# Patient Record
Sex: MALE
[De-identification: names, ages, dates, MRNs, and addresses within clinical notes are randomized per-mention and may not be internally consistent; named-entity substitution may affect disease eponyms.]

---

## 2020-03-28 NOTE — DIAGNOSTIC IMAGING REPORT
EXAMINATION:  CHEST SINGLE (PORTABLE)    



INDICATION:       

^ERMD ORDER

^[id number]

^1548

^Y    



COMPARISON:  CT abdomen and pelvis 12/05/2017 and chest radiograph 12/05/2017

     

FINDINGS:  AP view   



TUBES and LINES:  None.



LUNGS:  Lungs are well inflated.  Mildly increased bilateral hilar and lower

lobes reticular opacities with peribronchial thickening.   No lobar

consolidation.



PLEURA:  No pleural effusion or pneumothorax.



HEART AND MEDIASTINUM:  The cardiomediastinal silhouette is unremarkable..  



BONES AND SOFT TISSUES:  No acute osseous lesion.  Soft tissues are

unremarkable.



UPPER ABDOMEN: No free air under the diaphragm.    



IMPRESSION: 

Radiographic findings may suggest atypical infection or Central pulmonary

vascular congestion.





Signed by: Dr. Brooke Banegas M.D. on 3/28/2020 4:27 PM

## 2020-03-28 NOTE — XMS REPORT
Patient Summary Document

                             Created on: 2020



SHONA DIEHL

External Reference #: [id number]

: 1960

Sex: Male



Demographics







                          Address                   [address] [place] [address]  [address]

 

                          Home Phone                [phone]

 

                          Preferred Language        Unknown

 

                          Marital Status            Unknown

 

                          Jewish Affiliation     Unknown

 

                          Race                      Unknown

 

                          Ethnic Group              Unknown





Author







                          Author                    [facility]

 

                          [facility]

 

                          Address                   Unknown

 

                          Phone                     Unavailable







Care Team Providers







                    Care Team Member Name    Role                Phone

 

                    ANGEL HARRELL    Unavailable         Unavailable







Problems

This patient has no known problems.



Allergies, Adverse Reactions, Alerts

This patient has no known allergies or adverse reactions.



Medications

This patient has no known medications.



Results







           Test Description    Test Time    Test Comments    Text Results    Atomic Results    Result

 Comments

 

                CT ABDOMEN/PELVIS W                                        [place] [address]      Patient Name: SHONA DIEHL   MR #: [id number]    : 1960 Age/Sex: 57/M  Acct #: [id number] Req 
#: [id number]  Adm Physician:     Ordered by: GRANT QUIJANO NP  Report #: 
[id number]   Location: ER  Room/Bed:     
__________________________________________________________________________
_________________________    Procedure: [id number] CT/CT ABDOMEN/PELVIS W  Exam 
Date: 17                            Exam Time: 2200       REPORT STATUS: 
Signed    EXAM: CT ABDOMEN AND PELVIS with IV CONTRAST   DATE: 2017 8:40 PM
 Time stamp on Exam: 2211 hours   INDICATION:  Abdominal pain   COMPARISON:  
None    TECHNIQUE: The abdomen and pelvis were scanned using a multidetector 
helical   scanner. Coronal and sagittal reformations were obtained. Routine 
protocol   performed.   IV Contrast: 100 cc Isovue-370   Oral Contrast: 
Gastrografin   CTDIvol has been reviewed. It is below the limits set by the 
Radiation Protocol   Committee (RPC).      FINDINGS:   LOWER THORAX: No 
consolidations      LIVER: No masses   BILIARY: Cholecystectomy. Expected mild 
prominence of the bile ducts secondary   to reservoir effect.       SPLEEN: No 
masses   PANCREAS: No masses      ADRENALS: No nodules   KIDNEYS: Symmetric 
perfusion. No enhancing masses. No hydronephrosis.      GI TRACT: No distention,
wall thickening or evidence of obstruction. Normal   appendix.      VESSELS: 
Mild atherosclerotic changes without aneurysm.   PERITONEUM/RETROPERITONEUM: No 
free air or fluid   LYMPH NODES: No lymphadenopathy      REPRODUCTIVE ORGANS: 
Unremarkable   BLADDER: Unremarkable      SOFT TISSUES: Unremarkable   BONES: No
suspicious bone lesions. Degenerative changes of the lower thoracic   and lumbar
spine with chronic wedging of the lower thoracic spine.      IMPRESSION:   No CT
findings to explain patient's abdominal pain. No bowel obstruction or   evidence
of appendicitis.      Signed by: Dr. Moreno Ibrahim M.D. on 2017 10:25 
PM        Dictated By: MORENO IBRAHIM MD  Electronically Signed By: MORENO IBRAHIM MD on 17  Transcribed By: BORIS on 17       COPY TO:   
GRANT QUIJANO NP                       

 

                CHEST SINGLE (PORTABLE)                                        [place] [address]      Patient Name: 
SHONA DIEHL   MR #: [id number]    : 1960 Age/Sex: 57/M  Acct #: 
[id number] Req #: [id number]  Adm Physician:     Ordered by: GRANT QUIJANO 
NP  Report #: [id number]   Location: ER  Room/Bed:     
__________________________________________________________________________
_________________________    Procedure: [id number] DX/CHEST SINGLE (PORTABLE)  
Exam Date: 17                            Exam Time:        REPORT 
STATUS: Signed    EXAM: CHEST SINGLE (PORTABLE), AP 1 view   DATE: 2017 
8:31 PM  Time stamp on exam: 2100 hours   INDICATION: Nausea, stomach pain   
COMPARISON: None      FINDINGS:   LINES/TUBES: None      LUNGS: No 
consolidations or edema. Nodular densities projected over each lung   base are 
most likely nipple shadows.      PLEURA: No effusions or pneumothorax.      
HEART AND MEDIASTINUM: Normal size and contour.      BONES AND SOFT TISSUES: No 
acute findings.       IMPRESSION:   No acute thoracic abnormality.            
Signed by: Dr. Moreno Ibrahim M.D. on 2017 9:31 PM        Dictated By: 
MORENO IBRAHIM MD  Electronically Signed By: MORENO IBRAHIM MD on 17  
Transcribed By: BORIS on 17       COPY TO:   GRANT QUIJANO NP

## 2020-03-28 NOTE — DIAGNOSTIC IMAGING REPORT
EXAM: CT Abdomen and Pelvis WITH contrast  

INDICATION:      

^abd pain/n/v/

^[id number]

^1550    



COMPARISON: CT abdomen and pelvis 12 4/5/2017

TECHNIQUE: Abdomen and pelvis were scanned utilizing a multidetector helical

scanner from the lung base to the pubic symphysis after administration of IV

contrast. Coronal and sagittal reformations were obtained. Routine protocol was

performed. Scan was performed when during portal venous phase.

            IV CONTRAST: 100 mL of Isovue-370

            ORAL CONTRAST: None

            RADIATION DOSE: Total DLP: 289.97 mGy*cm

             Estimated effective dose: (DLP x 0.015 x size factor) mSv

            COMPLICATIONS: None



FINDINGS:



LINES and TUBES: None.



LOWER THORAX:  Coronary artery calcifications. Lung bases are clear. Mild

circumferential wall thickening of the distal esophagus may be related to small

hiatal hernia and/or esophageal wall thickening.



HEPATOBILIARY:      No focal hepatic lesions. No biliary ductal dilation. 



GALLBLADDER: Cholecystectomy.



SPLEEN: No splenomegaly. 



PANCREAS: No focal masses or ductal dilatation.  



ADRENALS: No adrenal nodules    



KIDNEYS/URETERS: Kidneys enhance symmetrically.  No hydronephrosis. No cystic

or solid mass lesions.  No stones.



GI TRACT: No abnormal distention, wall thickening, or evidence of bowel

obstruction.       Appendix is normal.



PELVIC ORGANS/BLADDER: Unremarkable.



LYMPH NODES: No lymphadenopathy.



VESSELS: Unremarkable.



PERITONEUM / RETROPERITONEUM: No free air or fluid.



BONES: Mild multilevel degenerative changes of the upper lumbar spine.



SOFT TISSUES: Unremarkable.            



IMPRESSION: 

No acute abnormalities in the abdomen and pelvis.



Signed by: Dr. Brooke Banegas M.D. on 3/28/2020 5:14 PM

## 2020-03-29 NOTE — NUR
Bedside report given to morning nurse. Pt alert and orient to name, lying in bed HOB 45 
degrees. c/o mild abdominal pain. Call light within reach. Bed low and locked.

## 2020-03-29 NOTE — NUR
Manual /100.after receiving scheduled and PRN blood pressure meds. Deepa, NP was 
notified. New order received for cardiology consult for uncontrolled htn

## 2020-03-30 NOTE — NUR
BEDSIDE REPORT RECEIVED FROM DAY RN. PT RESTING IN SEMI-FOWLERS POSITION IN BED WATCHING TV. 
RESPIRATIONS ARE  EVEN AND UNLABORED. TELE ON. NS +20KCL INFUSING  ML/HR VIA IV PUMP. 
PROTONIX DRIP INFUSING AT 10 ML/HR. PT DENIES PAIN AT THIS TIME. PT STATES HE IS REALLY 
HUNGRY. VOIDING WITHOUT DIFFICULTY. CALL LIGHT WITHIN REACH. BED IN LOW POSITION.

## 2020-03-30 NOTE — OPERATIVE REPORT
DATE OF PROCEDURE:  03/30/2020

 

SURGEON:  Jorge Reyes MD

 

PROCEDURE:  EGD with biopsies.

 

INDICATIONS FOR EGD:  Intractable nausea and vomiting, upper abdominal pain.

 

MEDICATIONS:  The patient was done under MAC, please see anesthesiologist's note.

 

PROCEDURE IN DETAIL:  With the patient in the left lateral decubitus position, a

flexible fiberoptic Olympus gastroscope was introduced into the esophagus under direct

visualization without any difficulty.  There was some patchy erythema noted in distal

esophagus.  The scope was then advanced with ease into the stomach and mucosa overlying

the antrum and the body revealed some patchy intense erythema and low-grade to moderate

edema, and biopsies were obtained and sent to stain for H. pylori.  Pylorus was

intubated with ease and the scope was advanced all the way to the second portion of the

duodenum.  Biopsies were obtained from the proximal second portion and the duodenal bulb

to rule out sprue.  The scope was then withdrawn back into the stomach and retroflexed.

Mucosa overlying the fundus and the cardia appeared to be within normal limits.  The

scope was then straightened out, it was subsequently withdrawn, and the patient

tolerated the procedure well. 

 

IMPRESSION:  

1. Distal esophagitis, mild.

2. Gastritis, biopsied, biopsies sent to stain for Helicobacter pylori.

3. Rule out sprue.

 

PLAN:  Follow up histology.  Continue current therapy.  Initiate clear liquid diet.

 

 

 

 

______________________________

Jorge Reyes MD

 

[facility]

D:  03/30/2020 10:54:12

T:  03/30/2020 11:04:34

Job #:  [id number]

 

cc:            Sánchez Hancock MD

## 2020-03-30 NOTE — NUR
Pt out of room and no family at bedside.   will follow up as able. 





KELLEY VALENTE



Spiritual Care Department

O: [phone]

## 2020-03-31 NOTE — NUR
BEDSIDE REPORT RECEIVED FROM DAY RN. PT IN SEMI-FOWLERS POSITION RESTING IN BED. PT IS ALERT 
AND ORIENTED X3. RESPIRATIONS ARE EVEN AND UNLABORED. PT REPORTS HAD SMALL LIQUID BM THIS 
AM. PT DENIES PAIN. 20 G PIV IN RT WRIST. PT UP TO BATHROOM VOIDING WITHOUT DIFFICULTY. TELE 
ON. PT TOLERATING DIET WELL. CALL LIGHT WITHIN REACH. BED LOCKED AND IN LO9W POSITION.

## 2020-04-01 NOTE — NUR
Pt. expressed no spiritual or emotional concerns at this time.  provided hospitality 
and information on how to reach , if needed. No need to follow at this time.





KELLEY VALETNE



Spiritual Care Department

O: [phone]

## 2020-04-01 NOTE — NUR
Patient discharged home. Leaves by wheelchair to personal vehicle. AAOx4. 98.1 oral temp, 83 
HR, 147/70, 18 RR, 97% RA, FSBG 198. IV discontinued and no signs of infiltration.

## 2020-04-01 NOTE — DISCHARGE SUMMARY
PERTINENT HISTORY AND PHYSICAL FINDINGS/CHIEF COMPLAINT:  Upper abdominal pain with

nausea and vomiting.  Mr. Alfred is a 59-year-old male, who admitted with complaints

of abdominal pain and associated nausea and vomiting for three days.  He was able to

keep liquids down.  Denied fever or diarrhea. 

 

PAST MEDICAL AND SURGICAL HISTORY:  Hypertension, type 2 diabetes mellitus,

gastroparesis, and gastroesophageal reflux disease.  Denies prior surgery. 

 

FAMILY HISTORY:  His sister had diabetes mellitus.  His father had a stroke.

 

SOCIAL HISTORY:  Noncontributory.

 

ALLERGIES:  SUDAFED.

 

ADMITTING DIAGNOSES:  

1. Abdominal pain with nausea.

2. Hypertension.

3. Type 2 diabetes mellitus.

4. Gastroesophageal reflux disease.

5. Hypokalemia.

 

DISCHARGE DIAGNOSES:  

1. Recent abdominal pain with nausea and vomiting, status post

esophagogastroduodenoscopy on 3/30/2020. 

2. Uncontrolled hypertension.

3. Uncontrolled type 2 diabetes mellitus.

4. Hyperlipidemia. 

On admission, WBCs 10.47, hemoglobin 16.2, hematocrit 46.2, and platelets 248.  Sodium

134, potassium 3.9, chloride 91, CO2 of 29, BUN 20, creatinine 0.73, EGFR greater than

60, glucose 209, calcium 10, total bilirubin 3.7, AST 14, ALT 16, alkaline phosphatase

108, creatine kinase 82, CK-MB 0.70, troponin I less than 0.001, and total protein 8.4.

Urinalysis positive for protein, glucose, ketones, blood, rbc's, granular casts, and

mucus.  Chest x-ray showed possible atypical infection or central pulmonary vascular

congestion.  CT of the abdomen and pelvis showed no acute abnormalities in the abdomen

or pelvis.  Per Dr. Reyes's note from yesterday and last night, nausea, vomiting, and

upper abdominal pain, all resolved.  He is tolerating an ADA diet well.  He is to remain

on oral Protonix for about 3 months and Reglan for 3-4 weeks.  Thus, those prescriptions

have been written along with Esidrix 25 mg p.o. daily, Catapres 0.1 mg p.o. every 4

hours p.r.n. for elevated blood pressure, Norvasc 10 mg p.o. daily, metoclopramide 10 mg

before meals and at bedtime, and Protonix 40 mg b.i.d. 

 

Discharge vital signs; temperature 98.1, heart rate 83, respirations 20, blood pressure

147/70, and oxygen saturation 97%. 

 

Yesterday; WBC 5.85, hemoglobin 13.3, hematocrit 38.2, and platelets 198.  Today; the

sodium 135, potassium 3.8, chloride 100, CO2 of 29, BUN 10, creatinine 0.68, estimated

GFR greater than 60, glucose 140, total bilirubin 1.5, and total protein 5.9.  LFTs are

within normal limits.  The patient is to continue with ADA diet.  Activity level as

tolerated.  Follow up with Dr. Reyes in his office.  Follow up with Cardiology p.r.n.  Follow up with his PCP, Dr. Donavan Kelsey in 1-2 weeks. 

 

 

Dictated by Nitish Saldana NP

 

______________________________

MD [facility]

D:  04/01/2020 13:58:26

T:  04/01/2020 14:40:16

Job #:  [id number]

## 2021-04-18 ENCOUNTER — HOSPITAL ENCOUNTER (EMERGENCY)
Dept: HOSPITAL 88 - ER | Age: 61
Discharge: HOME | End: 2021-04-18
Payer: COMMERCIAL

## 2021-04-18 VITALS — BODY MASS INDEX: 27.64 KG/M2 | HEIGHT: 66 IN | WEIGHT: 172 LBS

## 2021-04-18 DIAGNOSIS — K21.9: ICD-10-CM

## 2021-04-18 DIAGNOSIS — R19.7: ICD-10-CM

## 2021-04-18 DIAGNOSIS — R10.84: Primary | ICD-10-CM

## 2021-04-18 DIAGNOSIS — I10: ICD-10-CM

## 2021-04-18 DIAGNOSIS — E11.65: ICD-10-CM

## 2021-04-18 DIAGNOSIS — R11.2: ICD-10-CM

## 2021-04-18 LAB
ALBUMIN SERPL-MCNC: 4.8 G/DL (ref 3.5–5)
ALBUMIN/GLOB SERPL: 1.2 {RATIO} (ref 0.8–2)
ALP SERPL-CCNC: 105 IU/L (ref 40–150)
ALT SERPL-CCNC: 13 IU/L (ref 0–55)
ANION GAP SERPL CALC-SCNC: 18.8 MMOL/L (ref 8–16)
BACTERIA URNS QL MICRO: (no result) /HPF
BASOPHILS # BLD AUTO: 0 10*3/UL (ref 0–0.1)
BASOPHILS NFR BLD AUTO: 0.2 % (ref 0–1)
BUN SERPL-MCNC: 21 MG/DL (ref 7–26)
BUN/CREAT SERPL: 28 (ref 6–25)
CALCIUM SERPL-MCNC: 9.4 MG/DL (ref 8.4–10.2)
CHLORIDE SERPL-SCNC: 94 MMOL/L (ref 98–107)
CLARITY UR: CLEAR
CO2 SERPL-SCNC: 24 MMOL/L (ref 22–29)
COLOR UR: YELLOW
DEPRECATED NEUTROPHILS # BLD AUTO: 5.6 10*3/UL (ref 2.1–6.9)
DEPRECATED RBC URNS MANUAL-ACNC: (no result) /HPF (ref 0–5)
EGFRCR SERPLBLD CKD-EPI 2021: > 60 ML/MIN (ref 60–?)
EOSINOPHIL # BLD AUTO: 0 10*3/UL (ref 0–0.4)
EOSINOPHIL NFR BLD AUTO: 0.1 % (ref 0–6)
EPI CELLS URNS QL MICRO: (no result) /LPF
ERYTHROCYTE [DISTWIDTH] IN CORD BLOOD: 12.8 % (ref 11.7–14.4)
GLOBULIN PLAS-MCNC: 3.9 G/DL (ref 2.3–3.5)
GLUCOSE SERPLBLD-MCNC: 206 MG/DL (ref 74–118)
HCT VFR BLD AUTO: 45.7 % (ref 38.2–49.6)
HGB BLD-MCNC: 15.9 G/DL (ref 14–18)
KETONES UR QL STRIP.AUTO: (no result)
LEUKOCYTE ESTERASE UR QL STRIP.AUTO: NEGATIVE
LIPASE SERPL-CCNC: 9 U/L (ref 8–78)
LYMPHOCYTES # BLD: 1.5 10*3/UL (ref 1–3.2)
LYMPHOCYTES NFR BLD AUTO: 18.7 % (ref 18–39.1)
MCH RBC QN AUTO: 30.3 PG (ref 28–32)
MCHC RBC AUTO-ENTMCNC: 34.8 G/DL (ref 31–35)
MCV RBC AUTO: 87 FL (ref 81–99)
MONOCYTES # BLD AUTO: 0.8 10*3/UL (ref 0.2–0.8)
MONOCYTES NFR BLD AUTO: 10.1 % (ref 4.4–11.3)
MUCOUS THREADS URNS QL MICRO: (no result)
NEUTS SEG NFR BLD AUTO: 70.5 % (ref 38.7–80)
NITRITE UR QL STRIP.AUTO: NEGATIVE
PLATELET # BLD AUTO: 252 X10E3/UL (ref 140–360)
POTASSIUM SERPL-SCNC: 3.8 MMOL/L (ref 3.5–5.1)
PROT UR QL STRIP.AUTO: (no result)
RBC # BLD AUTO: 5.25 X10E6/UL (ref 4.3–5.7)
SODIUM SERPL-SCNC: 133 MMOL/L (ref 136–145)
SP GR UR STRIP: 1.02 (ref 1.01–1.02)
UROBILINOGEN UR STRIP-MCNC: 0.2 MG/DL (ref 0.2–1)
WBC #/AREA URNS HPF: (no result) /HPF (ref 0–5)

## 2021-04-18 PROCEDURE — 80053 COMPREHEN METABOLIC PANEL: CPT

## 2021-04-18 PROCEDURE — 81001 URINALYSIS AUTO W/SCOPE: CPT

## 2021-04-18 PROCEDURE — 99284 EMERGENCY DEPT VISIT MOD MDM: CPT

## 2021-04-18 PROCEDURE — 83690 ASSAY OF LIPASE: CPT

## 2021-04-18 PROCEDURE — 85025 COMPLETE CBC W/AUTO DIFF WBC: CPT

## 2021-04-18 PROCEDURE — 76705 ECHO EXAM OF ABDOMEN: CPT

## 2021-04-18 PROCEDURE — 36415 COLL VENOUS BLD VENIPUNCTURE: CPT

## 2021-05-25 ENCOUNTER — HOSPITAL ENCOUNTER (INPATIENT)
Dept: HOSPITAL 88 - ER | Age: 61
LOS: 4 days | Discharge: HOME | DRG: 392 | End: 2021-05-29
Attending: INTERNAL MEDICINE | Admitting: INTERNAL MEDICINE
Payer: COMMERCIAL

## 2021-05-25 VITALS — SYSTOLIC BLOOD PRESSURE: 132 MMHG | DIASTOLIC BLOOD PRESSURE: 76 MMHG

## 2021-05-25 VITALS — SYSTOLIC BLOOD PRESSURE: 186 MMHG | DIASTOLIC BLOOD PRESSURE: 93 MMHG

## 2021-05-25 VITALS — DIASTOLIC BLOOD PRESSURE: 97 MMHG | SYSTOLIC BLOOD PRESSURE: 184 MMHG

## 2021-05-25 VITALS — SYSTOLIC BLOOD PRESSURE: 184 MMHG | DIASTOLIC BLOOD PRESSURE: 97 MMHG

## 2021-05-25 VITALS — HEIGHT: 66 IN | WEIGHT: 165 LBS | BODY MASS INDEX: 26.52 KG/M2

## 2021-05-25 VITALS — DIASTOLIC BLOOD PRESSURE: 93 MMHG | SYSTOLIC BLOOD PRESSURE: 186 MMHG

## 2021-05-25 DIAGNOSIS — E87.1: ICD-10-CM

## 2021-05-25 DIAGNOSIS — K21.9: ICD-10-CM

## 2021-05-25 DIAGNOSIS — K20.90: Primary | ICD-10-CM

## 2021-05-25 DIAGNOSIS — I16.0: ICD-10-CM

## 2021-05-25 DIAGNOSIS — E87.6: ICD-10-CM

## 2021-05-25 DIAGNOSIS — K29.70: ICD-10-CM

## 2021-05-25 DIAGNOSIS — E83.42: ICD-10-CM

## 2021-05-25 DIAGNOSIS — K22.2: ICD-10-CM

## 2021-05-25 DIAGNOSIS — T46.5X6A: ICD-10-CM

## 2021-05-25 DIAGNOSIS — Z20.822: ICD-10-CM

## 2021-05-25 DIAGNOSIS — Z88.8: ICD-10-CM

## 2021-05-25 DIAGNOSIS — K31.84: ICD-10-CM

## 2021-05-25 DIAGNOSIS — I10: ICD-10-CM

## 2021-05-25 DIAGNOSIS — E11.9: ICD-10-CM

## 2021-05-25 DIAGNOSIS — E11.43: ICD-10-CM

## 2021-05-25 LAB
ALBUMIN SERPL-MCNC: 4.6 G/DL (ref 3.5–5)
ALBUMIN/GLOB SERPL: 1.4 {RATIO} (ref 0.8–2)
ALP SERPL-CCNC: 95 IU/L (ref 40–150)
ALT SERPL-CCNC: 13 IU/L (ref 0–55)
ANION GAP SERPL CALC-SCNC: 16.3 MMOL/L (ref 8–16)
BACTERIA URNS QL MICRO: (no result) /HPF
BASOPHILS # BLD AUTO: 0 10*3/UL (ref 0–0.1)
BASOPHILS NFR BLD AUTO: 0.1 % (ref 0–1)
BUN SERPL-MCNC: 16 MG/DL (ref 7–26)
BUN/CREAT SERPL: 24 (ref 6–25)
CALCIUM SERPL-MCNC: 9.8 MG/DL (ref 8.4–10.2)
CHLORIDE SERPL-SCNC: 93 MMOL/L (ref 98–107)
CLARITY UR: CLEAR
CO2 SERPL-SCNC: 28 MMOL/L (ref 22–29)
COLOR UR: YELLOW
DEPRECATED NEUTROPHILS # BLD AUTO: 6.3 10*3/UL (ref 2.1–6.9)
DEPRECATED RBC URNS MANUAL-ACNC: (no result) /HPF (ref 0–5)
EGFRCR SERPLBLD CKD-EPI 2021: > 60 ML/MIN (ref 60–?)
EOSINOPHIL # BLD AUTO: 0 10*3/UL (ref 0–0.4)
EOSINOPHIL NFR BLD AUTO: 0 % (ref 0–6)
EPI CELLS URNS QL MICRO: (no result) /LPF
ERYTHROCYTE [DISTWIDTH] IN CORD BLOOD: 12.9 % (ref 11.7–14.4)
GLOBULIN PLAS-MCNC: 3.4 G/DL (ref 2.3–3.5)
GLUCOSE SERPLBLD-MCNC: 231 MG/DL (ref 74–118)
HCT VFR BLD AUTO: 44.2 % (ref 38.2–49.6)
HGB BLD-MCNC: 15.3 G/DL (ref 14–18)
KETONES UR QL STRIP.AUTO: (no result)
LEUKOCYTE ESTERASE UR QL STRIP.AUTO: NEGATIVE
LIPASE SERPL-CCNC: 9 U/L (ref 8–78)
LYMPHOCYTES # BLD: 1.1 10*3/UL (ref 1–3.2)
LYMPHOCYTES NFR BLD AUTO: 13.8 % (ref 18–39.1)
MCH RBC QN AUTO: 30.2 PG (ref 28–32)
MCHC RBC AUTO-ENTMCNC: 34.6 G/DL (ref 31–35)
MCV RBC AUTO: 87.4 FL (ref 81–99)
MONOCYTES # BLD AUTO: 0.7 10*3/UL (ref 0.2–0.8)
MONOCYTES NFR BLD AUTO: 8.3 % (ref 4.4–11.3)
MUCOUS THREADS URNS QL MICRO: (no result)
NEUTS SEG NFR BLD AUTO: 77.4 % (ref 38.7–80)
NITRITE UR QL STRIP.AUTO: NEGATIVE
PLATELET # BLD AUTO: 275 X10E3/UL (ref 140–360)
POTASSIUM SERPL-SCNC: 3.3 MMOL/L (ref 3.5–5.1)
PROT UR QL STRIP.AUTO: (no result)
RBC # BLD AUTO: 5.06 X10E6/UL (ref 4.3–5.7)
SODIUM SERPL-SCNC: 134 MMOL/L (ref 136–145)
SP GR UR STRIP: 1.02 (ref 1.01–1.02)
UROBILINOGEN UR STRIP-MCNC: 0.2 MG/DL (ref 0.2–1)
WBC #/AREA URNS HPF: (no result) /HPF (ref 0–5)

## 2021-05-25 PROCEDURE — 80053 COMPREHEN METABOLIC PANEL: CPT

## 2021-05-25 PROCEDURE — 80048 BASIC METABOLIC PNL TOTAL CA: CPT

## 2021-05-25 PROCEDURE — 74177 CT ABD & PELVIS W/CONTRAST: CPT

## 2021-05-25 PROCEDURE — 88312 SPECIAL STAINS GROUP 1: CPT

## 2021-05-25 PROCEDURE — 83735 ASSAY OF MAGNESIUM: CPT

## 2021-05-25 PROCEDURE — 81001 URINALYSIS AUTO W/SCOPE: CPT

## 2021-05-25 PROCEDURE — 74183 MRI ABD W/O CNTR FLWD CNTR: CPT

## 2021-05-25 PROCEDURE — 84484 ASSAY OF TROPONIN QUANT: CPT

## 2021-05-25 PROCEDURE — 36415 COLL VENOUS BLD VENIPUNCTURE: CPT

## 2021-05-25 PROCEDURE — 83036 HEMOGLOBIN GLYCOSYLATED A1C: CPT

## 2021-05-25 PROCEDURE — 93005 ELECTROCARDIOGRAM TRACING: CPT

## 2021-05-25 PROCEDURE — 82948 REAGENT STRIP/BLOOD GLUCOSE: CPT

## 2021-05-25 PROCEDURE — 88305 TISSUE EXAM BY PATHOLOGIST: CPT

## 2021-05-25 PROCEDURE — 99284 EMERGENCY DEPT VISIT MOD MDM: CPT

## 2021-05-25 PROCEDURE — 84100 ASSAY OF PHOSPHORUS: CPT

## 2021-05-25 PROCEDURE — 83690 ASSAY OF LIPASE: CPT

## 2021-05-25 PROCEDURE — 85025 COMPLETE CBC W/AUTO DIFF WBC: CPT

## 2021-05-25 PROCEDURE — 43239 EGD BIOPSY SINGLE/MULTIPLE: CPT

## 2021-05-25 RX ADMIN — Medication PRN MG: at 18:13

## 2021-05-25 RX ADMIN — FENTANYL CITRATE PRN MCG: 50 INJECTION INTRAMUSCULAR; INTRAVENOUS at 16:50

## 2021-05-25 RX ADMIN — FENTANYL CITRATE PRN MCG: 50 INJECTION INTRAMUSCULAR; INTRAVENOUS at 13:02

## 2021-05-25 RX ADMIN — HYDRALAZINE HYDROCHLORIDE PRN MG: 20 INJECTION INTRAMUSCULAR; INTRAVENOUS at 20:50

## 2021-05-25 RX ADMIN — Medication PRN MG: at 14:06

## 2021-05-25 RX ADMIN — SODIUM CHLORIDE PRN MG: 900 INJECTION INTRAVENOUS at 22:00

## 2021-05-25 RX ADMIN — FENTANYL CITRATE PRN MCG: 50 INJECTION INTRAMUSCULAR; INTRAVENOUS at 09:56

## 2021-05-25 RX ADMIN — Medication PRN MG: at 22:00

## 2021-05-25 RX ADMIN — SODIUM CHLORIDE PRN MG: 900 INJECTION INTRAVENOUS at 18:13

## 2021-05-26 VITALS — SYSTOLIC BLOOD PRESSURE: 129 MMHG | DIASTOLIC BLOOD PRESSURE: 61 MMHG

## 2021-05-26 VITALS — SYSTOLIC BLOOD PRESSURE: 133 MMHG | DIASTOLIC BLOOD PRESSURE: 76 MMHG

## 2021-05-26 VITALS — DIASTOLIC BLOOD PRESSURE: 61 MMHG | SYSTOLIC BLOOD PRESSURE: 129 MMHG

## 2021-05-26 VITALS — SYSTOLIC BLOOD PRESSURE: 169 MMHG | DIASTOLIC BLOOD PRESSURE: 82 MMHG

## 2021-05-26 VITALS — DIASTOLIC BLOOD PRESSURE: 70 MMHG | SYSTOLIC BLOOD PRESSURE: 140 MMHG

## 2021-05-26 VITALS — DIASTOLIC BLOOD PRESSURE: 68 MMHG | SYSTOLIC BLOOD PRESSURE: 123 MMHG

## 2021-05-26 VITALS — DIASTOLIC BLOOD PRESSURE: 76 MMHG | SYSTOLIC BLOOD PRESSURE: 139 MMHG

## 2021-05-26 VITALS — SYSTOLIC BLOOD PRESSURE: 134 MMHG | DIASTOLIC BLOOD PRESSURE: 66 MMHG

## 2021-05-26 VITALS — SYSTOLIC BLOOD PRESSURE: 139 MMHG | DIASTOLIC BLOOD PRESSURE: 76 MMHG

## 2021-05-26 LAB
ANION GAP SERPL CALC-SCNC: 14 MMOL/L (ref 8–16)
BASOPHILS # BLD AUTO: 0 10*3/UL (ref 0–0.1)
BASOPHILS NFR BLD AUTO: 0.3 % (ref 0–1)
BUN SERPL-MCNC: 23 MG/DL (ref 7–26)
BUN/CREAT SERPL: 34 (ref 6–25)
CALCIUM SERPL-MCNC: 9.2 MG/DL (ref 8.4–10.2)
CHLORIDE SERPL-SCNC: 96 MMOL/L (ref 98–107)
CO2 SERPL-SCNC: 27 MMOL/L (ref 22–29)
DEPRECATED NEUTROPHILS # BLD AUTO: 4.7 10*3/UL (ref 2.1–6.9)
EGFRCR SERPLBLD CKD-EPI 2021: > 60 ML/MIN (ref 60–?)
EOSINOPHIL # BLD AUTO: 0 10*3/UL (ref 0–0.4)
EOSINOPHIL NFR BLD AUTO: 0.1 % (ref 0–6)
ERYTHROCYTE [DISTWIDTH] IN CORD BLOOD: 12.8 % (ref 11.7–14.4)
GLUCOSE SERPLBLD-MCNC: 142 MG/DL (ref 74–118)
HCT VFR BLD AUTO: 40.7 % (ref 38.2–49.6)
HGB BLD-MCNC: 14 G/DL (ref 14–18)
LYMPHOCYTES # BLD: 1.8 10*3/UL (ref 1–3.2)
LYMPHOCYTES NFR BLD AUTO: 24.2 % (ref 18–39.1)
MCH RBC QN AUTO: 29.9 PG (ref 28–32)
MCHC RBC AUTO-ENTMCNC: 34.4 G/DL (ref 31–35)
MCV RBC AUTO: 87 FL (ref 81–99)
MONOCYTES # BLD AUTO: 0.9 10*3/UL (ref 0.2–0.8)
MONOCYTES NFR BLD AUTO: 12 % (ref 4.4–11.3)
NEUTS SEG NFR BLD AUTO: 63.3 % (ref 38.7–80)
PLATELET # BLD AUTO: 255 X10E3/UL (ref 140–360)
POTASSIUM SERPL-SCNC: 3 MMOL/L (ref 3.5–5.1)
RBC # BLD AUTO: 4.68 X10E6/UL (ref 4.3–5.7)
SODIUM SERPL-SCNC: 134 MMOL/L (ref 136–145)

## 2021-05-26 PROCEDURE — 0D758ZZ DILATION OF ESOPHAGUS, VIA NATURAL OR ARTIFICIAL OPENING ENDOSCOPIC: ICD-10-PCS | Performed by: INTERNAL MEDICINE

## 2021-05-26 PROCEDURE — 0DB98ZX EXCISION OF DUODENUM, VIA NATURAL OR ARTIFICIAL OPENING ENDOSCOPIC, DIAGNOSTIC: ICD-10-PCS | Performed by: INTERNAL MEDICINE

## 2021-05-26 PROCEDURE — 0DB68ZX EXCISION OF STOMACH, VIA NATURAL OR ARTIFICIAL OPENING ENDOSCOPIC, DIAGNOSTIC: ICD-10-PCS | Performed by: INTERNAL MEDICINE

## 2021-05-26 RX ADMIN — SODIUM CHLORIDE PRN MG: 900 INJECTION INTRAVENOUS at 15:10

## 2021-05-26 RX ADMIN — SODIUM CHLORIDE PRN MG: 900 INJECTION INTRAVENOUS at 02:05

## 2021-05-26 RX ADMIN — Medication PRN MG: at 02:05

## 2021-05-26 RX ADMIN — Medication PRN MG: at 06:15

## 2021-05-26 RX ADMIN — SODIUM CHLORIDE PRN MG: 900 INJECTION INTRAVENOUS at 10:50

## 2021-05-26 RX ADMIN — SODIUM CHLORIDE PRN MG: 900 INJECTION INTRAVENOUS at 20:52

## 2021-05-26 RX ADMIN — Medication PRN MG: at 20:52

## 2021-05-26 RX ADMIN — HYDRALAZINE HYDROCHLORIDE PRN MG: 20 INJECTION INTRAMUSCULAR; INTRAVENOUS at 06:15

## 2021-05-26 RX ADMIN — Medication PRN MG: at 10:50

## 2021-05-26 RX ADMIN — SUCRALFATE SCH GM: 1 TABLET ORAL at 20:52

## 2021-05-26 RX ADMIN — SODIUM CHLORIDE PRN MG: 900 INJECTION INTRAVENOUS at 06:15

## 2021-05-26 RX ADMIN — Medication PRN MG: at 15:10

## 2021-05-27 VITALS — DIASTOLIC BLOOD PRESSURE: 78 MMHG | SYSTOLIC BLOOD PRESSURE: 126 MMHG

## 2021-05-27 VITALS — SYSTOLIC BLOOD PRESSURE: 127 MMHG | DIASTOLIC BLOOD PRESSURE: 74 MMHG

## 2021-05-27 VITALS — SYSTOLIC BLOOD PRESSURE: 146 MMHG | DIASTOLIC BLOOD PRESSURE: 79 MMHG

## 2021-05-27 VITALS — DIASTOLIC BLOOD PRESSURE: 74 MMHG | SYSTOLIC BLOOD PRESSURE: 127 MMHG

## 2021-05-27 VITALS — DIASTOLIC BLOOD PRESSURE: 77 MMHG | SYSTOLIC BLOOD PRESSURE: 138 MMHG

## 2021-05-27 VITALS — DIASTOLIC BLOOD PRESSURE: 89 MMHG | SYSTOLIC BLOOD PRESSURE: 159 MMHG

## 2021-05-27 VITALS — SYSTOLIC BLOOD PRESSURE: 132 MMHG | DIASTOLIC BLOOD PRESSURE: 69 MMHG

## 2021-05-27 VITALS — DIASTOLIC BLOOD PRESSURE: 79 MMHG | SYSTOLIC BLOOD PRESSURE: 145 MMHG

## 2021-05-27 LAB
ANION GAP SERPL CALC-SCNC: 14.2 MMOL/L (ref 8–16)
BASOPHILS # BLD AUTO: 0 10*3/UL (ref 0–0.1)
BASOPHILS NFR BLD AUTO: 0.3 % (ref 0–1)
BUN SERPL-MCNC: 27 MG/DL (ref 7–26)
BUN/CREAT SERPL: 43 (ref 6–25)
CALCIUM SERPL-MCNC: 8.6 MG/DL (ref 8.4–10.2)
CHLORIDE SERPL-SCNC: 96 MMOL/L (ref 98–107)
CO2 SERPL-SCNC: 27 MMOL/L (ref 22–29)
DEPRECATED NEUTROPHILS # BLD AUTO: 3.5 10*3/UL (ref 2.1–6.9)
EGFRCR SERPLBLD CKD-EPI 2021: > 60 ML/MIN (ref 60–?)
EOSINOPHIL # BLD AUTO: 0 10*3/UL (ref 0–0.4)
EOSINOPHIL NFR BLD AUTO: 0.4 % (ref 0–6)
ERYTHROCYTE [DISTWIDTH] IN CORD BLOOD: 12.6 % (ref 11.7–14.4)
GLUCOSE SERPLBLD-MCNC: 96 MG/DL (ref 74–118)
HCT VFR BLD AUTO: 39 % (ref 38.2–49.6)
HGB BLD-MCNC: 13.4 G/DL (ref 14–18)
LYMPHOCYTES # BLD: 2.2 10*3/UL (ref 1–3.2)
LYMPHOCYTES NFR BLD AUTO: 32.8 % (ref 18–39.1)
MCH RBC QN AUTO: 30.3 PG (ref 28–32)
MCHC RBC AUTO-ENTMCNC: 34.4 G/DL (ref 31–35)
MCV RBC AUTO: 88.2 FL (ref 81–99)
MONOCYTES # BLD AUTO: 0.9 10*3/UL (ref 0.2–0.8)
MONOCYTES NFR BLD AUTO: 13.6 % (ref 4.4–11.3)
NEUTS SEG NFR BLD AUTO: 52.6 % (ref 38.7–80)
PLATELET # BLD AUTO: 232 X10E3/UL (ref 140–360)
POTASSIUM SERPL-SCNC: 3.2 MMOL/L (ref 3.5–5.1)
RBC # BLD AUTO: 4.42 X10E6/UL (ref 4.3–5.7)
SODIUM SERPL-SCNC: 134 MMOL/L (ref 136–145)

## 2021-05-27 RX ADMIN — SODIUM CHLORIDE PRN MG: 900 INJECTION INTRAVENOUS at 01:00

## 2021-05-27 RX ADMIN — Medication PRN MG: at 06:39

## 2021-05-27 RX ADMIN — SODIUM CHLORIDE SCH MG: 900 INJECTION INTRAVENOUS at 09:00

## 2021-05-27 RX ADMIN — SUCRALFATE SCH GM: 1 TABLET ORAL at 16:12

## 2021-05-27 RX ADMIN — SODIUM CHLORIDE PRN MG: 900 INJECTION INTRAVENOUS at 11:50

## 2021-05-27 RX ADMIN — SODIUM CHLORIDE SCH MG: 900 INJECTION INTRAVENOUS at 21:30

## 2021-05-27 RX ADMIN — SODIUM CHLORIDE PRN MG: 900 INJECTION INTRAVENOUS at 06:39

## 2021-05-27 RX ADMIN — SUCRALFATE SCH GM: 1 TABLET ORAL at 21:30

## 2021-05-27 RX ADMIN — SUCRALFATE SCH GM: 1 TABLET ORAL at 07:30

## 2021-05-27 RX ADMIN — Medication PRN MG: at 01:00

## 2021-05-27 RX ADMIN — Medication PRN MG: at 11:50

## 2021-05-27 RX ADMIN — SUCRALFATE SCH GM: 1 TABLET ORAL at 11:30

## 2021-05-28 VITALS — DIASTOLIC BLOOD PRESSURE: 78 MMHG | SYSTOLIC BLOOD PRESSURE: 148 MMHG

## 2021-05-28 VITALS — DIASTOLIC BLOOD PRESSURE: 77 MMHG | SYSTOLIC BLOOD PRESSURE: 140 MMHG

## 2021-05-28 VITALS — SYSTOLIC BLOOD PRESSURE: 146 MMHG | DIASTOLIC BLOOD PRESSURE: 72 MMHG

## 2021-05-28 VITALS — DIASTOLIC BLOOD PRESSURE: 76 MMHG | SYSTOLIC BLOOD PRESSURE: 145 MMHG

## 2021-05-28 VITALS — SYSTOLIC BLOOD PRESSURE: 148 MMHG | DIASTOLIC BLOOD PRESSURE: 78 MMHG

## 2021-05-28 VITALS — SYSTOLIC BLOOD PRESSURE: 130 MMHG | DIASTOLIC BLOOD PRESSURE: 69 MMHG

## 2021-05-28 LAB
ANION GAP SERPL CALC-SCNC: 11.2 MMOL/L (ref 8–16)
BASOPHILS # BLD AUTO: 0 10*3/UL (ref 0–0.1)
BASOPHILS NFR BLD AUTO: 0.3 % (ref 0–1)
BUN SERPL-MCNC: 12 MG/DL (ref 7–26)
BUN/CREAT SERPL: 20 (ref 6–25)
CALCIUM SERPL-MCNC: 8.5 MG/DL (ref 8.4–10.2)
CHLORIDE SERPL-SCNC: 97 MMOL/L (ref 98–107)
CO2 SERPL-SCNC: 30 MMOL/L (ref 22–29)
DEPRECATED NEUTROPHILS # BLD AUTO: 3.2 10*3/UL (ref 2.1–6.9)
EGFRCR SERPLBLD CKD-EPI 2021: > 60 ML/MIN (ref 60–?)
EOSINOPHIL # BLD AUTO: 0.1 10*3/UL (ref 0–0.4)
EOSINOPHIL NFR BLD AUTO: 1 % (ref 0–6)
ERYTHROCYTE [DISTWIDTH] IN CORD BLOOD: 12.2 % (ref 11.7–14.4)
GLUCOSE SERPLBLD-MCNC: 137 MG/DL (ref 74–118)
HCT VFR BLD AUTO: 38.2 % (ref 38.2–49.6)
HGB BLD-MCNC: 13.4 G/DL (ref 14–18)
LYMPHOCYTES # BLD: 2 10*3/UL (ref 1–3.2)
LYMPHOCYTES NFR BLD AUTO: 33.2 % (ref 18–39.1)
MCH RBC QN AUTO: 30.4 PG (ref 28–32)
MCHC RBC AUTO-ENTMCNC: 35.1 G/DL (ref 31–35)
MCV RBC AUTO: 86.6 FL (ref 81–99)
MONOCYTES # BLD AUTO: 0.7 10*3/UL (ref 0.2–0.8)
MONOCYTES NFR BLD AUTO: 11.1 % (ref 4.4–11.3)
NEUTS SEG NFR BLD AUTO: 54.1 % (ref 38.7–80)
PLATELET # BLD AUTO: 216 X10E3/UL (ref 140–360)
POTASSIUM SERPL-SCNC: 3.2 MMOL/L (ref 3.5–5.1)
RBC # BLD AUTO: 4.41 X10E6/UL (ref 4.3–5.7)
SODIUM SERPL-SCNC: 135 MMOL/L (ref 136–145)

## 2021-05-28 RX ADMIN — METFORMIN HYDROCHLORIDE SCH MG: 500 TABLET, FILM COATED ORAL at 08:48

## 2021-05-28 RX ADMIN — METFORMIN HYDROCHLORIDE SCH MG: 500 TABLET, FILM COATED ORAL at 16:31

## 2021-05-28 RX ADMIN — SUCRALFATE SCH GM: 1 TABLET ORAL at 11:08

## 2021-05-28 RX ADMIN — SODIUM CHLORIDE SCH MG: 900 INJECTION INTRAVENOUS at 21:04

## 2021-05-28 RX ADMIN — SUCRALFATE SCH GM: 1 TABLET ORAL at 21:04

## 2021-05-28 RX ADMIN — SUCRALFATE SCH GM: 1 TABLET ORAL at 08:48

## 2021-05-28 RX ADMIN — SUCRALFATE SCH GM: 1 TABLET ORAL at 16:31

## 2021-05-28 RX ADMIN — SODIUM CHLORIDE SCH MG: 900 INJECTION INTRAVENOUS at 08:49

## 2021-05-29 VITALS — DIASTOLIC BLOOD PRESSURE: 83 MMHG | SYSTOLIC BLOOD PRESSURE: 136 MMHG

## 2021-05-29 VITALS — SYSTOLIC BLOOD PRESSURE: 136 MMHG | DIASTOLIC BLOOD PRESSURE: 83 MMHG

## 2021-05-29 VITALS — SYSTOLIC BLOOD PRESSURE: 168 MMHG | DIASTOLIC BLOOD PRESSURE: 92 MMHG

## 2021-05-29 VITALS — DIASTOLIC BLOOD PRESSURE: 78 MMHG | SYSTOLIC BLOOD PRESSURE: 146 MMHG

## 2021-05-29 LAB
ANION GAP SERPL CALC-SCNC: 14.1 MMOL/L (ref 8–16)
BASOPHILS # BLD AUTO: 0 10*3/UL (ref 0–0.1)
BASOPHILS NFR BLD AUTO: 0.3 % (ref 0–1)
BUN SERPL-MCNC: 12 MG/DL (ref 7–26)
BUN/CREAT SERPL: 20 (ref 6–25)
CALCIUM SERPL-MCNC: 8.9 MG/DL (ref 8.4–10.2)
CHLORIDE SERPL-SCNC: 99 MMOL/L (ref 98–107)
CO2 SERPL-SCNC: 26 MMOL/L (ref 22–29)
DEPRECATED NEUTROPHILS # BLD AUTO: 4.1 10*3/UL (ref 2.1–6.9)
DEPRECATED PHOSPHATE SERPL-MCNC: 2.4 MG/DL (ref 2.3–4.7)
EGFRCR SERPLBLD CKD-EPI 2021: > 60 ML/MIN (ref 60–?)
EOSINOPHIL # BLD AUTO: 0.1 10*3/UL (ref 0–0.4)
EOSINOPHIL NFR BLD AUTO: 1 % (ref 0–6)
ERYTHROCYTE [DISTWIDTH] IN CORD BLOOD: 12.3 % (ref 11.7–14.4)
GLUCOSE SERPLBLD-MCNC: 143 MG/DL (ref 74–118)
HCT VFR BLD AUTO: 41.7 % (ref 38.2–49.6)
HGB BLD-MCNC: 14.5 G/DL (ref 14–18)
LYMPHOCYTES # BLD: 2.1 10*3/UL (ref 1–3.2)
LYMPHOCYTES NFR BLD AUTO: 30.4 % (ref 18–39.1)
MAGNESIUM SERPL-MCNC: 1.8 MG/DL (ref 1.3–2.1)
MCH RBC QN AUTO: 30.4 PG (ref 28–32)
MCHC RBC AUTO-ENTMCNC: 34.8 G/DL (ref 31–35)
MCV RBC AUTO: 87.4 FL (ref 81–99)
MONOCYTES # BLD AUTO: 0.6 10*3/UL (ref 0.2–0.8)
MONOCYTES NFR BLD AUTO: 8.4 % (ref 4.4–11.3)
NEUTS SEG NFR BLD AUTO: 59.6 % (ref 38.7–80)
PLATELET # BLD AUTO: 226 X10E3/UL (ref 140–360)
POTASSIUM SERPL-SCNC: 4.1 MMOL/L (ref 3.5–5.1)
RBC # BLD AUTO: 4.77 X10E6/UL (ref 4.3–5.7)
SODIUM SERPL-SCNC: 135 MMOL/L (ref 136–145)

## 2021-05-29 RX ADMIN — METFORMIN HYDROCHLORIDE SCH MG: 500 TABLET, FILM COATED ORAL at 08:17

## 2021-05-29 RX ADMIN — SODIUM CHLORIDE SCH MG: 900 INJECTION INTRAVENOUS at 08:17

## 2021-05-29 RX ADMIN — SUCRALFATE SCH GM: 1 TABLET ORAL at 08:17

## 2021-05-29 RX ADMIN — HYDRALAZINE HYDROCHLORIDE PRN MG: 20 INJECTION INTRAMUSCULAR; INTRAVENOUS at 05:12

## 2021-05-29 RX ADMIN — SUCRALFATE SCH GM: 1 TABLET ORAL at 11:36

## 2021-06-25 ENCOUNTER — HOSPITAL ENCOUNTER (EMERGENCY)
Dept: HOSPITAL 88 - ER | Age: 61
Discharge: HOME | End: 2021-06-25
Payer: COMMERCIAL

## 2021-06-25 VITALS — HEIGHT: 66 IN | BODY MASS INDEX: 26.52 KG/M2 | WEIGHT: 165 LBS

## 2021-06-25 DIAGNOSIS — I10: ICD-10-CM

## 2021-06-25 DIAGNOSIS — R10.13: Primary | ICD-10-CM

## 2021-06-25 DIAGNOSIS — E11.65: ICD-10-CM

## 2021-06-25 DIAGNOSIS — K29.70: ICD-10-CM

## 2021-06-25 DIAGNOSIS — R11.0: ICD-10-CM

## 2021-06-25 LAB
ALBUMIN SERPL-MCNC: 4.6 G/DL (ref 3.5–5)
ALBUMIN/GLOB SERPL: 1.4 {RATIO} (ref 0.8–2)
ALP SERPL-CCNC: 93 IU/L (ref 40–150)
ALT SERPL-CCNC: 10 IU/L (ref 0–55)
ANION GAP SERPL CALC-SCNC: 16.3 MMOL/L (ref 8–16)
BASOPHILS # BLD AUTO: 0 10*3/UL (ref 0–0.1)
BASOPHILS NFR BLD AUTO: 0.3 % (ref 0–1)
BUN SERPL-MCNC: 19 MG/DL (ref 7–26)
BUN/CREAT SERPL: 28 (ref 6–25)
CALCIUM SERPL-MCNC: 9.5 MG/DL (ref 8.4–10.2)
CHLORIDE SERPL-SCNC: 95 MMOL/L (ref 98–107)
CK MB SERPL-MCNC: 0.7 NG/ML (ref 0–5)
CK SERPL-CCNC: 32 IU/L (ref 30–200)
CO2 SERPL-SCNC: 26 MMOL/L (ref 22–29)
DEPRECATED NEUTROPHILS # BLD AUTO: 5.8 10*3/UL (ref 2.1–6.9)
EGFRCR SERPLBLD CKD-EPI 2021: 117 ML/MIN (ref 60–?)
EOSINOPHIL # BLD AUTO: 0 10*3/UL (ref 0–0.4)
EOSINOPHIL NFR BLD AUTO: 0 % (ref 0–6)
ERYTHROCYTE [DISTWIDTH] IN CORD BLOOD: 12.9 % (ref 11.7–14.4)
GLOBULIN PLAS-MCNC: 3.4 G/DL (ref 2.3–3.5)
GLUCOSE SERPLBLD-MCNC: 205 MG/DL (ref 74–118)
HCT VFR BLD AUTO: 43.5 % (ref 38.2–49.6)
HGB BLD-MCNC: 15 G/DL (ref 14–18)
LYMPHOCYTES # BLD: 1.3 10*3/UL (ref 1–3.2)
LYMPHOCYTES NFR BLD AUTO: 17.3 % (ref 18–39.1)
MCH RBC QN AUTO: 29.9 PG (ref 28–32)
MCHC RBC AUTO-ENTMCNC: 34.5 G/DL (ref 31–35)
MCV RBC AUTO: 86.8 FL (ref 81–99)
MONOCYTES # BLD AUTO: 0.6 10*3/UL (ref 0.2–0.8)
MONOCYTES NFR BLD AUTO: 7.3 % (ref 4.4–11.3)
NEUTS SEG NFR BLD AUTO: 74.7 % (ref 38.7–80)
PLATELET # BLD AUTO: 267 X10E3/UL (ref 140–360)
POTASSIUM SERPL-SCNC: 3.3 MMOL/L (ref 3.5–5.1)
RBC # BLD AUTO: 5.01 X10E6/UL (ref 4.3–5.7)
SODIUM SERPL-SCNC: 134 MMOL/L (ref 136–145)

## 2021-06-25 PROCEDURE — 85025 COMPLETE CBC W/AUTO DIFF WBC: CPT

## 2021-06-25 PROCEDURE — 36415 COLL VENOUS BLD VENIPUNCTURE: CPT

## 2021-06-25 PROCEDURE — 84484 ASSAY OF TROPONIN QUANT: CPT

## 2021-06-25 PROCEDURE — 82553 CREATINE MB FRACTION: CPT

## 2021-06-25 PROCEDURE — 99284 EMERGENCY DEPT VISIT MOD MDM: CPT

## 2021-06-25 PROCEDURE — 74177 CT ABD & PELVIS W/CONTRAST: CPT

## 2021-06-25 PROCEDURE — 83690 ASSAY OF LIPASE: CPT

## 2021-06-25 PROCEDURE — 82550 ASSAY OF CK (CPK): CPT

## 2021-06-25 PROCEDURE — 82948 REAGENT STRIP/BLOOD GLUCOSE: CPT

## 2021-06-25 PROCEDURE — 80053 COMPREHEN METABOLIC PANEL: CPT

## 2024-11-07 LAB
BASOPHILS # BLD AUTO: 0 10*3/UL (ref 0–0.1)
BASOPHILS NFR BLD AUTO: 0.3 % (ref 0–1)
DEPRECATED NEUTROPHILS # BLD AUTO: 5.6 10*3/UL (ref 2.1–6.9)
EOSINOPHIL # BLD AUTO: 0 10*3/UL (ref 0–0.4)
EOSINOPHIL NFR BLD AUTO: 0.3 % (ref 0–6)
ERYTHROCYTE [DISTWIDTH] IN CORD BLOOD: 13.2 % (ref 11.7–14.4)
HCT VFR BLD AUTO: 42.8 % (ref 38.2–49.6)
HGB BLD-MCNC: 13.9 G/DL (ref 14–18)
LYMPHOCYTES # BLD: 2.1 10*3/UL (ref 1–3.2)
LYMPHOCYTES NFR BLD AUTO: 23.9 % (ref 18–39.1)
MCH RBC QN AUTO: 30.1 PG (ref 28–32)
MCHC RBC AUTO-ENTMCNC: 32.5 G/DL (ref 31–35)
MCV RBC AUTO: 92.6 FL (ref 81–99)
MONOCYTES # BLD AUTO: 0.9 10*3/UL (ref 0.2–0.8)
MONOCYTES NFR BLD AUTO: 10.8 % (ref 4.4–11.3)
NEUTS SEG NFR BLD AUTO: 64.4 % (ref 38.7–80)
PLATELET # BLD AUTO: 252 X10E3/UL (ref 140–360)
RBC # BLD AUTO: 4.62 X10E6/UL (ref 4.3–5.7)
WBC # BLD: 8.69 X10E3/UL (ref 4.8–10.8)

## 2024-11-09 ENCOUNTER — HOSPITAL ENCOUNTER (OUTPATIENT)
Dept: HOSPITAL 88 - OR | Age: 64
Discharge: HOME | End: 2024-11-09
Attending: INTERNAL MEDICINE
Payer: COMMERCIAL

## 2024-11-09 VITALS
SYSTOLIC BLOOD PRESSURE: 149 MMHG | RESPIRATION RATE: 18 BRPM | DIASTOLIC BLOOD PRESSURE: 95 MMHG | OXYGEN SATURATION: 98 % | HEART RATE: 93 BPM

## 2024-11-09 VITALS — TEMPERATURE: 98.8 F

## 2024-11-09 DIAGNOSIS — D12.5: ICD-10-CM

## 2024-11-09 DIAGNOSIS — Z79.899: ICD-10-CM

## 2024-11-09 DIAGNOSIS — K21.9: ICD-10-CM

## 2024-11-09 DIAGNOSIS — K64.8: ICD-10-CM

## 2024-11-09 DIAGNOSIS — Z09: Primary | ICD-10-CM

## 2024-11-09 DIAGNOSIS — Z88.8: ICD-10-CM

## 2024-11-09 DIAGNOSIS — E11.9: ICD-10-CM

## 2024-11-09 DIAGNOSIS — C34.90: ICD-10-CM

## 2024-11-09 DIAGNOSIS — Z79.1: ICD-10-CM

## 2024-11-09 DIAGNOSIS — Z01.810: ICD-10-CM

## 2024-11-09 DIAGNOSIS — I10: ICD-10-CM

## 2024-11-09 DIAGNOSIS — D12.4: ICD-10-CM

## 2024-11-09 DIAGNOSIS — Z01.812: ICD-10-CM

## 2024-11-09 PROCEDURE — 45378 DIAGNOSTIC COLONOSCOPY: CPT

## 2024-11-09 PROCEDURE — 36415 COLL VENOUS BLD VENIPUNCTURE: CPT

## 2024-11-09 PROCEDURE — 45385 COLONOSCOPY W/LESION REMOVAL: CPT

## 2024-11-09 PROCEDURE — 93005 ELECTROCARDIOGRAM TRACING: CPT

## 2024-11-09 PROCEDURE — 85025 COMPLETE CBC W/AUTO DIFF WBC: CPT

## 2024-11-09 PROCEDURE — 82948 REAGENT STRIP/BLOOD GLUCOSE: CPT

## 2024-11-09 RX ADMIN — SODIUM CHLORIDE, POTASSIUM CHLORIDE, SODIUM LACTATE AND CALCIUM CHLORIDE ONE: 600; 310; 30; 20 INJECTION, SOLUTION INTRAVENOUS at 13:13
